# Patient Record
Sex: FEMALE | Race: WHITE
[De-identification: names, ages, dates, MRNs, and addresses within clinical notes are randomized per-mention and may not be internally consistent; named-entity substitution may affect disease eponyms.]

---

## 2020-01-16 ENCOUNTER — HOSPITAL ENCOUNTER (OUTPATIENT)
Dept: HOSPITAL 11 - JP.SDS | Age: 50
Discharge: HOME | End: 2020-01-16
Attending: SURGERY
Payer: COMMERCIAL

## 2020-01-16 DIAGNOSIS — Z12.11: Primary | ICD-10-CM

## 2020-01-16 DIAGNOSIS — E03.9: ICD-10-CM

## 2020-01-16 NOTE — OR
DATE OF PROCEDURE:  01/16/2020

 

SURGEON:  Orlando Stein MD

 

PREOPERATIVE DIAGNOSIS:  Indications for screening colonoscopy.

 

POSTOPERATIVE DIAGNOSIS:  Normal screening colonoscopy.

 

OPERATIVE PROCEDURE:  Flexible colonoscopy.

 

ANESTHESIA:  IV sedation.

 

INDICATION FOR PROCEDURE:  This 49-year-old female is presenting with indication for

screening colonoscopy.  She has not had a previous colonoscopy.  It is notable that she has

a first cousin, who at age 38, had developed stage 4 colon cancer.  The plan is to proceed

with a colonoscopy with biopsies and/or polypectomy as indicated.  Potential risks including

bleeding and perforation were discussed, and the patient wishes to proceed.

 

DETAILS OF PROCEDURE:  The patient was taken to the operating room and placed in a left

lateral decubitus position.  IV sedation was administered, after which, the initial digital

rectal exam was performed and was unremarkable.  Colonoscope was then passed into the rectum

with retroflexion revealing uncomplicated hemorrhoidal columns.  The scope was then

eventually passed to the cecum.  The prep was very good with very minimal amount of liquid

stool present.  To that level, there were no abnormalities noted; specifically no

diverticula, no colitis, and no areas of polyps or other signs of neoplasia.  The scope was

then withdrawn, the above findings reconfirmed, and the procedure then concluded.  The

patient was taken to the recovery room in satisfactory condition.

 

With the family history as mentioned above, I think I would err on the side of caution and

have this patient on a 5-year followup colonoscopy schedule.

 

 

 

 

Orlando Stein MD

DD:  01/16/2020 14:32:07

DT:  01/16/2020 17:30:05

Job #:  [id number]